# Patient Record
Sex: FEMALE | Race: WHITE | Employment: PART TIME | URBAN - METROPOLITAN AREA
[De-identification: names, ages, dates, MRNs, and addresses within clinical notes are randomized per-mention and may not be internally consistent; named-entity substitution may affect disease eponyms.]

---

## 2017-10-13 ENCOUNTER — HOSPITAL ENCOUNTER (OUTPATIENT)
Age: 67
Discharge: HOME | End: 2017-10-13
Attending: INTERNAL MEDICINE
Payer: COMMERCIAL

## 2017-10-13 DIAGNOSIS — G35 MULTIPLE SCLEROSIS (HCC): ICD-10-CM

## 2017-10-13 LAB — CREAT UR-MCNC: 1.1 MG/DL (ref 0.6–1.3)

## 2017-10-13 PROCEDURE — 74011250636 HC RX REV CODE- 250/636: Performed by: INTERNAL MEDICINE

## 2017-10-13 PROCEDURE — 82565 ASSAY OF CREATININE: CPT

## 2017-10-13 PROCEDURE — A9585 GADOBUTROL INJECTION: HCPCS | Performed by: INTERNAL MEDICINE

## 2017-10-13 PROCEDURE — 70553 MRI BRAIN STEM W/O & W/DYE: CPT

## 2017-10-13 RX ADMIN — GADOBUTROL 8 ML: 604.72 INJECTION INTRAVENOUS at 22:00

## 2018-08-06 ENCOUNTER — DOCUMENTATION ONLY (OUTPATIENT)
Dept: NEUROLOGY | Age: 68
End: 2018-08-06

## 2018-08-07 ENCOUNTER — OFFICE VISIT (OUTPATIENT)
Dept: NEUROLOGY | Age: 68
End: 2018-08-07

## 2018-08-07 VITALS
BODY MASS INDEX: 31.14 KG/M2 | TEMPERATURE: 99 F | WEIGHT: 169.2 LBS | SYSTOLIC BLOOD PRESSURE: 110 MMHG | HEIGHT: 62 IN | OXYGEN SATURATION: 95 % | DIASTOLIC BLOOD PRESSURE: 72 MMHG | HEART RATE: 80 BPM | RESPIRATION RATE: 16 BRPM

## 2018-08-07 DIAGNOSIS — G47.09 OTHER INSOMNIA: ICD-10-CM

## 2018-08-07 DIAGNOSIS — F32.89 OTHER DEPRESSION: ICD-10-CM

## 2018-08-07 DIAGNOSIS — M54.81 BILATERAL OCCIPITAL NEURALGIA: ICD-10-CM

## 2018-08-07 DIAGNOSIS — G50.0 SUPRAORBITAL NEURALGIA: ICD-10-CM

## 2018-08-07 DIAGNOSIS — G44.221 CHRONIC TENSION-TYPE HEADACHE, INTRACTABLE: Primary | ICD-10-CM

## 2018-08-07 RX ORDER — NAPROXEN 500 MG/1
500 TABLET ORAL
Qty: 30 TAB | Refills: 2 | Status: SHIPPED | OUTPATIENT
Start: 2018-08-07

## 2018-08-07 NOTE — MR AVS SNAPSHOT
[address] [facility] ([place] 
 
 
 [address] [facility] [address] 
[phone] Patient: Becky Rivero MRN: [id number] IIA:[id number] Visit Information Date & Time Provider Department Dept. Phone Encounter #  
 8/7/2018  3:00 PM Daryle Perkins, MD [address] at 777 [facility] [id number] Follow-up Instructions Return in about 2 months (around 10/7/2018). Your Appointments 10/9/2018  8:30 AM  
Follow Up with Daryle Perkins, MD  
[address] at [address] [facility]) Appt Note: 2 month fu  
 [address] [address] [address] [facility] 630 [facility] [address] 200 [facility] Se Upcoming Health Maintenance Date Due Hepatitis C Screening 1950 DTaP/Tdap/Td series (1 - Tdap) 12/20/1971 BREAST CANCER SCRN MAMMOGRAM 12/20/2000 FOBT Q 1 YEAR AGE 50-75 12/20/2000 ZOSTER VACCINE AGE 60> 10/20/2010 GLAUCOMA SCREENING Q2Y 12/20/2015 Bone Densitometry (Dexa) Screening 12/20/2015 Pneumococcal 65+ Low/Medium Risk (1 of 2 - PCV13) 12/20/2015 Influenza Age 5 to Adult 8/1/2018 Allergies as of 8/7/2018  Review Complete On: 8/7/2018 By: Kayli Rendon LPN Severity Noted Reaction Type Reactions Betadine [Povidone-iodine]  11/28/2014    Rash Iodinated Contrast- Oral And Iv Dye  11/28/2014    Rash Macrodantin [Nitrofurantoin Macrocrystalline]  03/30/2016   Systemic Nausea and Vomiting Current Immunizations  Never Reviewed No immunizations on file. Not reviewed this visit You Were Diagnosed With   
  
 Codes Comments Chronic tension-type headache, intractable    -  Primary ICD-10-CM: W78.050 ICD-9-CM: 339.12 Supraorbital neuralgia     ICD-10-CM: G52.9 ICD-9-CM: 352.9 Bilateral occipital neuralgia     ICD-10-CM: M54.81 ICD-9-CM: 723.8  Other depression     ICD-10-CM: F32.89 
 ICD-9-CM: 596 Other insomnia     ICD-10-CM: G47.09 
ICD-9-CM: 780.52 Vitals BP Pulse Temp Resp Height(growth percentile) Weight(growth percentile) 110/72 (BP 1 Location: Left arm, BP Patient Position: Sitting) 80 99 °F (37.2 °C) (Oral) 16 5' 2\" (1.575 m) 169 lb 3.2 oz (76.7 kg) SpO2 BMI OB Status Smoking Status 95% 30.95 kg/m2 Hysterectomy Never Smoker Vitals History BMI and BSA Data Body Mass Index Body Surface Area 30.95 kg/m 2 1.83 m 2 Preferred Pharmacy Pharmacy Name Phone [facility], [address] [place] [address] Road [phone] Your Updated Medication List  
  
   
This list is accurate as of 8/7/18  4:05 PM.  Always use your most recent med list.  
  
  
  
  
 cyclobenzaprine 10 mg tablet Commonly known as:  FLEXERIL Take  by mouth three (3) times daily as needed for Muscle Spasm(s). naproxen 500 mg tablet Commonly known as:  NAPROSYN Take 1 Tab by mouth two (2) times daily as needed. Do not exceed two days a week Prescriptions Sent to Pharmacy Refills  
 naproxen (NAPROSYN) 500 mg tablet 2 Sig: Take 1 Tab by mouth two (2) times daily as needed. Do not exceed two days a week Class: Normal  
 Pharmacy: Con[facility]ita Valerio [address] [place] [address], [address] [place] [address] #: [phone] Route: Oral  
  
We Performed the Following REFERRAL TO PAIN CLINIC [YCI62 Custom] Comments:  
 Left supraorbital and bilateral occipital neuralgias, please evaluate for blocks. Thanks. REFERRAL TO PHYSICAL THERAPY [RBR37 Custom] Comments:  
 Bilateral occipital neuralgias, please evaluate and treat for cervical exercises, HEP. Thanks. Follow-up Instructions Return in about 2 months (around 10/7/2018). Referral Information Referral ID Referred By Referred To  
  
 [id number] [place] [address] [address]   
   [address] [facility] [address], [address] [place] Phone: [phone] Fax: [phone] Visits Status Start Date End Date 1 New Request 8/7/18 8/7/19 If your referral has a status of pending review or denied, additional information will be sent to support the outcome of this decision. Referral ID Referred By Referred To  
 [id number] Audrey Aw E Not Available Visits Status Start Date End Date 1 New Request 8/7/18 8/7/19 If your referral has a status of pending review or denied, additional information will be sent to support the outcome of this decision. Introducing [facility]! Dear Magda Mayfield: Thank you for requesting a [facility] account. Our records indicate that you already have an active [facility] account. You can access your account anytime at [email or web address]. [email or web address] Did you know that you can access your hospital and ER discharge instructions at any time in [facility]? You can also review all of your test results from your hospital stay or ER visit. Additional Information If you have questions, please visit the Frequently Asked Questions section of the [facility] website at [email or web address] Remember, [facility] is NOT to be used for urgent needs. For medical emergencies, dial 911. Now available from your iPhone and Android! Please provide this summary of care documentation to your next provider. Your primary care clinician is listed as Susu Timmons. If you have any questions after today's visit, please call [phone].
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
## 2018-08-07 NOTE — PROGRESS NOTES
Chief Complaint   Patient presents with   [facility] Establish Care    Neurologic Problem     MS, previously seen by Dr. Elle Reyes in [place], wants to establish with new doctor that is closer to her home. Says she was diagnosed in 46s, but previous MDs thought she has had it since her teens.  Headache     worse in past 2 years, starts behind left eye and they go through her head, causes nausea, pain never gets better then 5/10 in pain and goes \"off the charts\"- never goes away. The only thing that has helped is Excederin Migraine.  Muscle Pain     gets muscle aches in her legs, she states she gets broken blood vessels from them.      Weight Loss     has lost 50 lbs since December

## 2018-08-14 ENCOUNTER — HOSPITAL ENCOUNTER (OUTPATIENT)
Dept: PHYSICAL THERAPY | Age: 68
Discharge: HOME | End: 2018-08-14
Payer: COMMERCIAL

## 2018-08-14 PROCEDURE — 97110 THERAPEUTIC EXERCISES: CPT

## 2018-08-14 PROCEDURE — 97162 PT EVAL MOD COMPLEX 30 MIN: CPT

## 2018-08-14 PROCEDURE — G8984 CARRY CURRENT STATUS: HCPCS

## 2018-08-14 PROCEDURE — G8985 CARRY GOAL STATUS: HCPCS

## 2018-08-14 PROCEDURE — 97530 THERAPEUTIC ACTIVITIES: CPT

## 2018-08-14 NOTE — PROGRESS NOTES
PT DAILY TREATMENT NOTE     Patient Name: Arline Brock  Date:2018  : 1950  [x]  Patient  Verified  Payor: BLUE CROSS / Plan: [address] [place] [address] / Product Type: PPO /    In time:0800  Out time:08  Total Treatment Time (min): 42  Visit #: 1 of 8    Treatment Area: Chronic tension-type headache, intractable [G44.221]  Occipital neuralgia [M54.81]    SUBJECTIVE  Pain Level (0-10 scale): 5  Any medication changes, allergies to medications, adverse drug reactions, diagnosis change, or new procedure performed?: [x] No    [] Yes (see summary sheet for update)  Subjective functional status/changes:   [] No changes reported       OBJECTIVE    Modality rationale:    Min Type Additional Details    [] Estim:  []Unatt       []IFC  []Premod                        []Other:  []w/ice   []w/heat  Position:  Location:    [] Estim: []Att    []TENS instruct  []NMES                    []Other:  []w/US   []w/ice   []w/heat  Position:  Location:    []  Traction: [] Cervical       []Lumbar                       [] Prone          []Supine                       []Intermittent   []Continuous Lbs:  [] before manual  [] after manual    []  Ultrasound: []Continuous   [] Pulsed                           []1MHz   []3MHz W/cm2:  Location:    []  Iontophoresis with dexamethasone         Location: [] Take home patch   [] In clinic    []  Ice     []  heat  []  Ice massage  []  Laser   []  Anodyne Position:  Location:    []  Laser with stim  []  Other:  Position:  Location:    []  Vasopneumatic Device Pressure:       [] lo [] med [] hi   Temperature: [] lo [] med [] hi   [] Skin assessment post-treatment:  []intact []redness- no adverse reaction    []redness - adverse reaction:     22 min [x]Eval                  []Re-Eval       10 min Therapeutic Exercise:  [] See flow sheet : HEP   Rationale: increase ROM and increase strength to improve the patients ability to perform functional tasks    10 min Therapeutic Activity:  []  See flow sheet : Posture education, referred pain, MHP prior to HEP   Rationale: increase ROM  to improve the patients ability to improve functional mobilty             With   [] TE   [] TA   [] neuro   [] other: Patient Education: [x] Review HEP    [] Progressed/Changed HEP based on:   [x] positioning   [] body mechanics   [] transfers   [x] heat/ice application    [] other:      Other Objective/Functional Measures:       Pain Level (0-10 scale) post treatment: 5    ASSESSMENT/Changes in Function:      Patient will continue to benefit from skilled PT services to modify and progress therapeutic interventions, address functional mobility deficits, address ROM deficits, address strength deficits, analyze and address soft tissue restrictions, analyze and cue movement patterns and assess and modify postural abnormalities to attain remaining goals.      []  See Plan of Care  []  See progress note/recertification  []  See Discharge Summary         Progress towards goals / Updated goals:  Per [address]  []  Upgrade activities as tolerated     [x]  Continue plan of care  []  Update interventions per flow sheet       []  Discharge due to:_  []  Other:_      Kimani Darnell, PT 8/14/2018  8:46 AM    Future Appointments  Date Time Provider Yolanda Colbert   8/16/2018 7:30 AM Jose Fan, PTA [facility]   8/21/2018 7:00 AM Kimani Darnell, PT [facility]   8/23/2018 7:30 AM Jose Fan PTA [facility]   8/28/2018 7:00 AM Catherine Carrero, PTA [facility]   8/30/2018 7:30 AM Kimani Darnell, PT [facility]   9/4/2018 7:00 AM Kimani Darnell, PT [facility]   9/6/2018 7:00 AM Kimani Darnell, PT [facility]   10/9/2018 8:30 AM Stephany Boykin MD [address]

## 2018-08-14 NOTE — PROGRESS NOTES
[facility]  [address] [facility] [address]  [place], [address]  [phone] fax    Plan of Care/ Statement of Necessity for Physical Therapy Services    Patient name: Camille Yee Start of Care: 2018   Referral source: Mendel Rivers, MD : 1950    Medical Diagnosis: Chronic tension-type headache, intractable [G44.221]  Occipital neuralgia [M54.81]   Onset Date:1.5 years    Treatment Diagnosis: Chronic headaches, c/s myofascial restrictions, postural   Prior Hospitalization: see medical history Provider#: [id number]   Medications: Verified on Patient summary List    Comorbidities: ? MS, headaches   Prior Level of Function: functionally I with all activities      The Plan of Care and following information is based on the information from the initial evaluation. Assessment/ key information: 80 y/o female presents with c/o headaches that started about 1.5 years ago. She reports initially they were more intermittent and not as severe. She reports more recently they have become constant and debilitating due to the severity. She reports headaches are primarily located in the left frontal/orbital area but also from the top of her head and in the suboccipital region. She reports c/o neck pain as well but it is not as severe as the headaches. Pt demonstrates poor posture with increased t/s kyphosis, forward head and rounded shoulders, increased upper cervical extension. Poor t/s mobility and significant myofascial restrictions in c/s and t/s musculature. Significant + TTP noted throughout  B UT, c/s paraspinals, SCM and scalenes. Pt will benefit from PT to address aforementioned impairments.       Evaluation Complexity History MEDIUM  Complexity : 1-2 comorbidities / personal factors will impact the outcome/ POC ; Examination MEDIUM Complexity : 3 Standardized tests and measures addressing body structure, function, activity limitation and / or participation in recreation  ;Presentation MEDIUM Complexity : Evolving with changing characteristics  ; Clinical Decision Making MEDIUM Complexity : FOTO score of 26-74  Overall Complexity Rating: MEDIUM  Problem List: pain affecting function, decrease ROM, decrease strength, decrease ADL/ functional abilitiies, decrease activity tolerance and decrease flexibility/ joint mobility   Treatment Plan may include any combination of the following: Therapeutic exercise, Therapeutic activities, Neuromuscular re-education, Physical agent/modality, Manual therapy, Patient education and Self Care training  Patient / Family readiness to learn indicated by: asking questions and trying to perform skills  Persons(s) to be included in education: patient (P)  Barriers to Learning/Limitations: None  Patient Goal (s): To help with the headaches  Patient Self Reported Health Status: good  Rehabilitation Potential: fair    Short Term Goals: To be accomplished in 1 weeks:   1. Pt will be I and compliant with HEP  Long Term Goals: To be accomplished in 4 weeks:   1. Improve FOTO to predicted outcome for improved ability for daily tasks   2. Pt will report at least 50% reduction in headaches for improved ability to perform daily tasks   3. Pt will report increased posture awareness with daily activities to facilitate better posture. 4. Pt will report no difficulty with turning to look behind her. Frequency / Duration: Patient to be seen 2 times per week for 4 weeks. Patient/ Caregiver education and instruction: Diagnosis, prognosis, self care, activity modification and exercises   [x]  Plan of care has been reviewed with PTA    G-Codes (GP)  Carry   Current  CJ= 20-39%    Goal  CJ= 20-39%      The severity rating is based on clinical judgment and the FOTO score.     Certification Period: 08-14-18 to 09-13-18  Cori Bose, PT 8/14/2018 8:57 AM    ________________________________________________________________________    I certify that the above Therapy Services are being furnished while the patient is under my care. I agree with the treatment plan and certify that this therapy is necessary.     [de-identified] Signature:____________________  Date:____________Time: _________    Please sign and return to [facility]  [address] [facility] [address]  [place], [address]  [phone] fax

## 2018-08-16 ENCOUNTER — HOSPITAL ENCOUNTER (OUTPATIENT)
Dept: PHYSICAL THERAPY | Age: 68
Discharge: HOME | End: 2018-08-16
Payer: COMMERCIAL

## 2018-08-16 PROCEDURE — 97110 THERAPEUTIC EXERCISES: CPT

## 2018-08-16 PROCEDURE — 97140 MANUAL THERAPY 1/> REGIONS: CPT

## 2018-08-16 NOTE — PROGRESS NOTES
PT DAILY TREATMENT NOTE     Patient Name: Marie Blackwell  Date:2018  : 1950  [x]  Patient  Verified  Payor: BLUE CROSS / Plan: [address] [place] [address] / Product Type: PPO /    In time:7:30  Out time:8:15  Total Treatment Time (min): 45  Visit #: 2 of 8    Treatment Area: Chronic tension-type headache, intractable [G44.221]  Occipital neuralgia [M54.81]    SUBJECTIVE  Pain Level (0-10 scale): 5/10 HA, 6/10 C/S  Any medication changes, allergies to medications, adverse drug reactions, diagnosis change, or new procedure performed?: [x] No    [] Yes (see summary sheet for update)  Subjective functional status/changes:   [] No changes reported  \"I am sore and tight. \"    OBJECTIVE    37 min Therapeutic Exercise:  [x] See flow sheet :   Rationale: increase ROM, increase strength and increase proprioception to improve the patients ability to perform ADL's.    8 min Manual Therapy:  STM/DTM (B) UT, lev scap, C/S paraspinals. SOR. Rationale: decrease pain, increase ROM, increase tissue extensibility and decrease trigger points to improve activity tolerance. With   [x] TE   [] TA   [] neuro   [] other: Patient Education: [x] Review HEP    [] Progressed/Changed HEP based on:   [] positioning   [] body mechanics   [] transfers   [] heat/ice application    [] other:      Other Objective/Functional Measures: Initiated exercises per flow sheet. Pain Level (0-10 scale) post treatment: 4/10    ASSESSMENT/Changes in Function: First F/U visit. Poor posture, unable to fully correct after receiving VC's. Slight decrease in pain level post-treatment. Patient will continue to benefit from skilled PT services to modify and progress therapeutic interventions, address functional mobility deficits, address ROM deficits, address strength deficits, analyze and address soft tissue restrictions and analyze and modify body mechanics/ergonomics to attain remaining goals.      [x]  See Plan of Care  []  See progress note/recertification  []  See Discharge Summary         Progress towards goals / Updated goals:  Short Term Goals: To be accomplished in 1 weeks:                        1. Pt will be I and compliant with HEP - 8/16/2018  Long Term Goals: To be accomplished in 4 weeks:                        1. Improve FOTO to predicted outcome for improved ability for daily tasks                        2. Pt will report at least 50% reduction in headaches for improved ability to perform daily tasks                        3. Pt will report increased posture awareness with daily activities to facilitate better posture. 4. Pt will report no difficulty with turning to look behind her.      Frequency / Duration: Patient to be seen 2 times per week for 4 weeks.     PLAN  []  Upgrade activities as tolerated     [x]  Continue plan of care  []  Update interventions per flow sheet       []  Discharge due to:_  []  Other:_      David Nava PTA 8/16/2018  7:26 AM    Future Appointments  Date Time Provider Yolanda Colbert   8/16/2018 7:30 AM David Nava PTA [facility]   8/21/2018 7:00 AM Kylah Linder, PT [facility]   8/23/2018 7:30 AM David Nava, PTA [facility]   8/28/2018 7:00 AM Dangelo Hammond PTA [facility]   8/30/2018 7:30 AM Kylah Fortdoris, PT [facility]   9/4/2018 7:00 AM Kylah Fortdoris, PT [facility]   9/6/2018 7:00 AM Kylah Fortdoris, PT [facility]   10/9/2018 8:30 AM Renuka Herring MD [address] [place] [address]

## 2018-08-21 ENCOUNTER — HOSPITAL ENCOUNTER (OUTPATIENT)
Dept: PHYSICAL THERAPY | Age: 68
Discharge: HOME | End: 2018-08-21
Payer: COMMERCIAL

## 2018-08-21 PROCEDURE — 97140 MANUAL THERAPY 1/> REGIONS: CPT

## 2018-08-21 PROCEDURE — 97110 THERAPEUTIC EXERCISES: CPT

## 2018-08-21 NOTE — PROGRESS NOTES
PT DAILY TREATMENT NOTE     Patient Name: Emory Sacks  Date:2018  : 1950  [x]  Patient  Verified  Payor: BLUE CROSS / Plan: [address] [place] [address] / Product Type: PPO /    In time:7:00  Out time: 7:45  Total Treatment Time (min): 45  Visit #: 3 of 8    Treatment Area: Chronic tension-type headache, intractable [G44.221]  Occipital neuralgia [M54.81]    SUBJECTIVE  Pain Level (0-10 scale): 4/10  Any medication changes, allergies to medications, adverse drug reactions, diagnosis change, or new procedure performed?: [x] No    [] Yes (see summary sheet for update)  Subjective functional status/changes:   [] No changes reported  Pt reports she was sore after last time. OBJECTIVE    37 min Therapeutic Exercise:  [x] See flow sheet :   Rationale: increase ROM, increase strength and increase proprioception to improve the patients ability to perform ADL's.    8 min Manual Therapy:  STM/DTM to B SCM with focus on the right, C/S paraspinals. SOR. Rationale: decrease pain, increase ROM, increase tissue extensibility and decrease trigger points to improve activity tolerance. With   [x] TE   [] TA   [] neuro   [] other: Patient Education: [x] Review HEP    [] Progressed/Changed HEP based on:   [] positioning   [] body mechanics   [] transfers   [] heat/ice application    [] other:      Other Objective/Functional Measures: + trigger point proximal right SCM, increased reps with c/s rotation on ball    Pain Level (0-10 scale) post treatment: 4/10    ASSESSMENT/Changes in Function:  Pt with reproduction of headache pain with trigger point along right SCM. Attempted TPR but did not change her symptoms.      Patient will continue to benefit from skilled PT services to modify and progress therapeutic interventions, address functional mobility deficits, address ROM deficits, address strength deficits, analyze and address soft tissue restrictions and analyze and modify body mechanics/ergonomics to attain remaining goals. [x]  See Plan of Care  []  See progress note/recertification  []  See Discharge Summary         Progress towards goals / Updated goals:  Short Term Goals: To be accomplished in 1 weeks:                        1. Pt will be I and compliant with HEP - 8/16/2018  Long Term Goals: To be accomplished in 4 weeks:                        1. Improve FOTO to predicted outcome for improved ability for daily tasks                        2. Pt will report at least 50% reduction in headaches for improved ability to perform daily tasks                        3. Pt will report increased posture awareness with daily activities to facilitate better posture.                          4. Pt will report no difficulty with turning to look behind her.          PLAN  []  Upgrade activities as tolerated     [x]  Continue plan of care  []  Update interventions per flow sheet       []  Discharge due to:_  []  Other:_      Reuben Monson, PT 8/21/2018  7:06 AM    Future Appointments  Date Time Provider Yolanda Colbert   8/23/2018 7:30 AM Cheryl Bynum, PTA [facility]   8/28/2018 7:00 AM [facility]   8/30/2018 7:30 AM [facility]   9/4/2018 7:00 AM [facility]   9/6/2018 7:00 AM [facility]   10/9/2018 8:30 AM Rhea Soler MD [address]

## 2018-08-28 ENCOUNTER — HOSPITAL ENCOUNTER (OUTPATIENT)
Dept: PHYSICAL THERAPY | Age: 68
Discharge: HOME | End: 2018-08-28
Payer: COMMERCIAL

## 2018-08-28 PROCEDURE — 97140 MANUAL THERAPY 1/> REGIONS: CPT

## 2018-08-28 NOTE — PROGRESS NOTES
PT DAILY TREATMENT NOTE     Patient Name: Jayesh Miller  Date:2018  : 1950  [x]  Patient  Verified  Payor: JAIME Eure / Plan: [address] [place] [address] / Product Type: PPO /    In time:9:00  Out time:9:42  Total Treatment Time (min): 42  Visit #: 4 of 8    Treatment Area: Chronic tension-type headache, intractable [G44.221]  Occipital neuralgia [M54.81]    SUBJECTIVE  Pain Level (0-10 scale): 3/10  Any medication changes, allergies to medications, adverse drug reactions, diagnosis change, or new procedure performed?: [x] No    [] Yes (see summary sheet for update)  Subjective functional status/changes:   [] No changes reported  \"It's been really sore. Started with a bad HA on Saturday, feels like the mm's are ripping when I move. OBJECTIVE    34 min Therapeutic Exercise:  [x] See flow sheet :   Rationale: increase ROM, increase strength and increase proprioception to improve the patients ability to perform ADL's.     8 min Manual Therapy:  STM/DTM (B) UT, lev scap, C/S paraspinals, SCM, scalenes. SOR. Rationale: decrease pain, increase ROM, increase tissue extensibility and decrease trigger points to improve activity tolerance. With   [x] TE   [] TA   [] neuro   [] other: Patient Education: [x] Review HEP    [] Progressed/Changed HEP based on:   [] positioning   [] body mechanics   [] transfers   [] heat/ice application    [] other:      Other Objective/Functional Measures: Held several exercises secondary to pain. Exacerbated symptoms in the last few days. Pain Level (0-10 scale) post treatment: 6/10    ASSESSMENT/Changes in Function: Pain reported pain increased while performing exercises, decreased to 6/10 post-manual. Several restrictions noted throughout C/S musculature, occasional cueing to correct posture.     Patient will continue to benefit from skilled PT services to modify and progress therapeutic interventions, address functional mobility deficits, address ROM deficits, address strength deficits, analyze and address soft tissue restrictions and analyze and modify body mechanics/ergonomics to attain remaining goals. [x]  See Plan of Care  []  See progress note/recertification  []  See Discharge Summary         Progress towards goals / Updated goals:  Short Term Goals: To be accomplished in 1 weeks:                        1. Pt will be I and compliant with HEP - 8/16/2018  Long Term Goals: To be accomplished in 4 weeks:                        0. Improve FOTO to predicted outcome for improved ability for daily tasks                        0. Pt will report at least 50% reduction in headaches for improved ability to perform daily tasks                        2. Pt will report increased posture awareness with daily activities to facilitate better posture.                         4. Pt will report no difficulty with turning to look behind her. -  Not met.  8/28/2018    PLAN  []  Upgrade activities as tolerated     [x]  Continue plan of care  []  Update interventions per flow sheet       []  Discharge due to:_  []  Other:_      Elysia Lomeli PTA 8/28/2018  8:55 AM    Future Appointments  Date Time Provider Yolanda Colbert   8/28/2018 9:00 AM Elysia Lomeli PTA [facility]   8/30/2018 7:30 AM Gladys Gongora, PT [facility]   9/4/2018 7:00 AM [facility]   9/6/2018 7:00 AM [facility]   10/9/2018 8:30 AM Mariajose Helms MD [address]

## 2018-08-30 ENCOUNTER — HOSPITAL ENCOUNTER (OUTPATIENT)
Dept: PHYSICAL THERAPY | Age: 68
Discharge: HOME | End: 2018-08-30
Payer: COMMERCIAL

## 2018-08-30 PROCEDURE — 97014 ELECTRIC STIMULATION THERAPY: CPT

## 2018-08-30 PROCEDURE — 97110 THERAPEUTIC EXERCISES: CPT

## 2018-08-30 PROCEDURE — 97140 MANUAL THERAPY 1/> REGIONS: CPT

## 2018-08-30 NOTE — PROGRESS NOTES
PT DAILY TREATMENT NOTE     Patient Name: Maria G Machado  Date:2018  : 1950  [x]  Patient  Verified  Payor: BLUE CROSS / Plan: [address] [place] [address] / Product Type: PPO /    In time:730  Out time:8:08  Total Treatment Time (min):38   Visit #: 5 of 8    Treatment Area: Chronic tension-type headache, intractable [G44.221]  Occipital neuralgia [M54.81]    SUBJECTIVE  Pain Level (0-10 scale): 610  Any medication changes, allergies to medications, adverse drug reactions, diagnosis change, or new procedure performed?: [x] No    [] Yes (see summary sheet for update)  Subjective functional status/changes:   [] No changes reported  Pt reports pain increased since last session. She reports inability to turn her head to the right and feels like her muscles are ripping. OBJECTIVE    8 min Therapeutic Exercise:  [x] See flow sheet :   Rationale: increase ROM, increase strength and increase proprioception to improve the patients ability to perform ADL's. 20 min Manual Therapy:  Gentle STM/ MFR UT, lev scap, C/S paraspinals, SCM, scalenes. SOR. MET for right c/s rotation and PROM    Rationale: decrease pain, increase ROM, increase tissue extensibility and decrease trigger points to improve activity tolerance.     Modality rationale: decrease inflammation and decrease pain to improve the patients ability to perform functional tasks   Min Type Additional Details   10 [x] Estim:  [x]Unatt       []IFC  [x]Premod                        []Other:  [x]w/ice   []w/heat  Position: supine  Location:right UT    [] Estim: []Att    []TENS instruct  []NMES                    []Other:  []w/US   []w/ice   []w/heat  Position:  Location:    []  Traction: [] Cervical       []Lumbar                       [] Prone          []Supine                       []Intermittent   []Continuous Lbs:  [] before manual  [] after manual    []  Ultrasound: []Continuous   [] Pulsed                           []1MHz   []3MHz Location:  W/cm2:    []  Iontophoresis with dexamethasone         Location: [] Take home patch   [] In clinic    []  Ice     []  heat  []  Ice massage  []  Laser   []  Anodyne Position:  Location:    []  Laser with stim  []  Other: Position:  Location:    []  Vasopneumatic Device Pressure:       [] lo [] med [] hi   Temperature: [] lo [] med [] hi   [] Skin assessment post-treatment:  []intact []redness- no adverse reaction    []redness - adverse reaction:       With   [x] TE   [] TA   [] neuro   [] other: Patient Education: [x] Review HEP    [] Progressed/Changed HEP based on:   [] positioning   [] body mechanics   [] transfers   [] heat/ice application    [] other:      Other Objective/Functional Measures:  Held therex per flow sheet due to pain increase. Pain Level (0-10 scale) post treatment: 6/10    ASSESSMENT/Changes in Function: pt with significant right rotation restriction. Difficult to exam due to hypersensitivity to palpation. Performed ESTIM with CP following manual to assist with pain control. Pt still reported 6/10 pain level but also reported diminished tightness and felt she had some improved mobility. Patient will continue to benefit from skilled PT services to modify and progress therapeutic interventions, address functional mobility deficits, address ROM deficits, address strength deficits, analyze and address soft tissue restrictions and analyze and modify body mechanics/ergonomics to attain remaining goals. [x]  See Plan of Care  []  See progress note/recertification  []  See Discharge Summary         Progress towards goals / Updated goals:  Short Term Goals: To be accomplished in 1 weeks:                        1. Pt will be I and compliant with HEP - 8/16/2018  Long Term Goals: To be accomplished in 4 weeks:                        7.  Improve FOTO to predicted outcome for improved ability for daily tasks                        6. Pt will report at least 50% reduction in headaches for improved ability to perform daily tasks                        5. Pt will report increased posture awareness with daily activities to facilitate better posture.                         4. Pt will report no difficulty with turning to look behind her. -  Not met.  8/28/2018    PLAN  []  Upgrade activities as tolerated     [x]  Continue plan of care  []  Update interventions per flow sheet       []  Discharge due to:_  []  Other:_      Reno Rossi, PT 8/30/2018  7:35 AM    Future Appointments  Date Time Provider Yolanda Coatesi   9/4/2018 7:00 AM Reno Rossi, PT [facility]   9/6/2018 7:00 AM Reno Rossi, PT [facility]   10/9/2018 8:30 AM Vivi Dan MD [address]

## 2018-09-04 ENCOUNTER — HOSPITAL ENCOUNTER (OUTPATIENT)
Dept: PHYSICAL THERAPY | Age: 68
Discharge: HOME | End: 2018-09-04
Payer: COMMERCIAL

## 2018-09-04 PROCEDURE — 97014 ELECTRIC STIMULATION THERAPY: CPT

## 2018-09-04 PROCEDURE — 97110 THERAPEUTIC EXERCISES: CPT

## 2018-09-04 PROCEDURE — 97140 MANUAL THERAPY 1/> REGIONS: CPT

## 2018-09-06 ENCOUNTER — HOSPITAL ENCOUNTER (OUTPATIENT)
Dept: PHYSICAL THERAPY | Age: 68
Discharge: HOME | End: 2018-09-06
Payer: COMMERCIAL

## 2018-09-06 PROCEDURE — G8985 CARRY GOAL STATUS: HCPCS

## 2018-09-06 PROCEDURE — 97140 MANUAL THERAPY 1/> REGIONS: CPT

## 2018-09-06 PROCEDURE — G8984 CARRY CURRENT STATUS: HCPCS

## 2018-09-06 PROCEDURE — 97014 ELECTRIC STIMULATION THERAPY: CPT

## 2018-09-06 PROCEDURE — 97110 THERAPEUTIC EXERCISES: CPT

## 2018-09-06 NOTE — PROGRESS NOTES
PT DAILY TREATMENT NOTE     Patient Name: Whitney Thomas  Date:2018  : 1950  [x]  Patient  Verified  Payor: [place] MED[place] / Plan: [place] MEDICARE PART A / Product Type: Medicare /    In HOPS:3036  Out time:0753  Total Treatment Time (min): 55  Visit #: 7 of 8    Treatment Area: Chronic tension-type headache, intractable [G44.221]  Occipital neuralgia [M54.81]    SUBJECTIVE  Pain Level (0-10 scale): 4/10  Any medication changes, allergies to medications, adverse drug reactions, diagnosis change, or new procedure performed?: [x] No    [] Yes (see summary sheet for update)  Subjective functional status/changes:   [] No changes reported  Pt reports she feels better today. She reports improvement since starting with improved ROM. She states headaches have not improved. OBJECTIVE    33 min Therapeutic Exercise:  [x] See flow sheet :   Rationale: increase ROM, increase strength and increase proprioception to improve the patients ability to perform ADL's.     12 min Manual Therapy:  Gentle STM/ MFR UT, lev scap, C/S paraspinals, SCM, scalenes. SOR and PROM    Rationale: decrease pain, increase ROM, increase tissue extensibility and decrease trigger points to improve activity tolerance.     Modality rationale: decrease inflammation and decrease pain to improve the patients ability to perform functional tasks   Min Type Additional Details   10 [x] Estim:  [x]Unatt       []IFC  [x]Premod                        []Other:  [x]w/ice   []w/heat  Position: supine  Location:right UT    [] Estim: []Att    []TENS instruct  []NMES                    []Other:  []w/US   []w/ice   []w/heat  Position:  Location:    []  Traction: [] Cervical       []Lumbar                       [] Prone          []Supine                       []Intermittent   []Continuous Lbs:  [] before manual  [] after manual    []  Ultrasound: []Continuous   [] Pulsed                           []1MHz   []3MHz Location:  W/cm2:    []  Iontophoresis with dexamethasone         Location: [] Take home patch   [] In clinic    []  Ice     []  heat  []  Ice massage  []  Laser   []  Anodyne Position:  Location:    []  Laser with stim  []  Other: Position:  Location:    []  Vasopneumatic Device Pressure:       [] lo [] med [] hi   Temperature: [] lo [] med [] hi   [] Skin assessment post-treatment:  []intact []redness- no adverse reaction    []redness - adverse reaction:       With   [x] TE   [] TA   [] neuro   [] other: Patient Education: [x] Review HEP    [] Progressed/Changed HEP based on:   [] positioning   [] body mechanics   [] transfers   [] heat/ice application    [] other:      Other Objective/Functional Measures:  C/S AROM rotation: right: 52  Degrees   Left: 60 Degrees , FOTO: 43    Pain Level (0-10 scale) post treatment: 5    ASSESSMENT/Changes in Function:  Pt with improve c/s rotation and pt reports improvement with daily activities, like driving, despite significant decline in FOTO score. She will benefit from continuation to further her functional status. Patient will continue to benefit from skilled PT services to modify and progress therapeutic interventions, address functional mobility deficits, address ROM deficits, address strength deficits, analyze and address soft tissue restrictions and analyze and modify body mechanics/ergonomics to attain remaining goals. []  See Plan of Care  [x]  See progress note/recertification  []  See Discharge Summary         Progress towards goals / Updated goals:  Short Term Goals: To be accomplished in 1 weeks:                        1. Pt will be I and compliant with HEP - 8/16/2018   Long Term Goals: To be accomplished in 4 weeks:                        3.  Improve FOTO to predicted outcome for improved ability for daily tasks                        3. Pt will report at least 50% reduction in headaches for improved ability to perform daily tasks- no change 9-6-18                        3. Pt will report increased posture awareness with daily activities to facilitate better posture. - MET 9-4-18                        0. Pt will report no difficulty with turning to look behind her.  -  Progressing 9-6-18    PLAN  []  Upgrade activities as tolerated     [x]  Continue plan of care  []  Update interventions per flow sheet       []  Discharge due to:_  []  Other:_      Nancie Cheung, PT 9/6/2018  7:05 AM    Future Appointments  Date Time Provider Yolanda Colbert   10/9/2018 8:30 AM Valentín Stoll MD [address]

## 2018-09-13 ENCOUNTER — HOSPITAL ENCOUNTER (OUTPATIENT)
Dept: PHYSICAL THERAPY | Age: 68
Discharge: HOME | End: 2018-09-13
Payer: COMMERCIAL

## 2018-09-13 PROCEDURE — 97140 MANUAL THERAPY 1/> REGIONS: CPT

## 2018-09-13 PROCEDURE — 97110 THERAPEUTIC EXERCISES: CPT

## 2018-09-13 NOTE — PROGRESS NOTES
PT DAILY TREATMENT NOTE     Patient Name: Camille Yee  Date:2018  : 1950  [x]  Patient  Verified  Payor: JAIME RICKEY / Plan: [address] [place] [address] / Product Type: PPO /    In time:8:30  Out time:9:16  Total Treatment Time (min): 46  Visit #: 1 of     Treatment Area: Chronic tension-type headache, intractable [G44.221]  Occipital neuralgia [M54.81]    SUBJECTIVE  Pain Level (0-10 scale): 3/10  Any medication changes, allergies to medications, adverse drug reactions, diagnosis change, or new procedure performed?: [x] No    [] Yes (see summary sheet for update)  Subjective functional status/changes:   [] No changes reported  Pt reports some pain but states she drove from [place] yesterday. OBJECTIVE    Modality rationale: decrease inflammation and decrease pain to improve the patients ability to tolerate ADLs.     Min Type Additional Details    [] Estim:  []Unatt       []IFC  []Premod                        []Other:  []w/ice   []w/heat  Position:  Location:    [] Estim: []Att    []TENS instruct  []NMES                    []Other:  []w/US   []w/ice   []w/heat  Position:  Location:    []  Traction: [] Cervical       []Lumbar                       [] Prone          []Supine                       []Intermittent   []Continuous Lbs:  [] before manual  [] after manual    []  Ultrasound: []Continuous   [] Pulsed                           []1MHz   []3MHz W/cm2:  Location:    []  Iontophoresis with dexamethasone         Location: [] Take home patch   [] In clinic   10 [x]  Ice     []  heat  []  Ice massage  []  Laser   []  Anodyne Position: supine  Location:lumbar    []  Laser with stim  []  Other:  Position:  Location:    []  Vasopneumatic Device Pressure:       [] lo [] med [] hi   Temperature: [] lo [] med [] hi   [] Skin assessment post-treatment:  []intact []redness- no adverse reaction    []redness - adverse reaction:       26 min Therapeutic Exercise:  [x] See flow sheet : Rationale: increase ROM and increase strength to improve the patients ability to tolerate ADLs. 10 min Manual Therapy:  SOR, gentle MFR UT/LS/paraspinals. Rationale: decrease pain, increase ROM and increase tissue extensibility to improve tolerance to ADLs. With   [] TE   [] TA   [] neuro   [] other: Patient Education: [x] Review HEP    [] Progressed/Changed HEP based on:   [] positioning   [] body mechanics   [] transfers   [] heat/ice application    [] other:      Other Objective/Functional Measures: significant TTP to right UT, LS, CS paraspinals with manual treatment. Pain Level (0-10 scale) post treatment: 6/10    ASSESSMENT/Changes in Function: Pt had increased pain performing any exercises requiring movement of right UE. Patient will continue to benefit from skilled PT services to modify and progress therapeutic interventions, address functional mobility deficits, address ROM deficits, address strength deficits, analyze and address soft tissue restrictions, analyze and cue movement patterns and analyze and modify body mechanics/ergonomics to attain remaining goals. []  See Plan of Care  []  See progress note/recertification  []  See Discharge Summary         Progress towards goals / Updated goals:  Short Term Goals: To be accomplished in 1 weeks:                        1. Pt will be I and compliant with HEP - 8/16/2018   Long Term Goals: To be accomplished in 4 weeks:                        6. Improve FOTO to predicted outcome for improved ability for daily tasks                        0. Pt will report at least 50% reduction in headaches for improved ability to perform daily tasks- no change 9-6-18                        3. Pt will report increased posture awareness with daily activities to facilitate better posture. - MET 9-4-18                        0. Pt will report no difficulty with turning to look behind her.  -  Progressing 9-6-18    PLAN  []  Upgrade activities as tolerated     []  Continue plan of care  []  Update interventions per flow sheet       []  Discharge due to:_  []  Other:_      Oletha Gowers, PTA 9/13/2018  8:39 AM    Future Appointments  Date Time Provider Yolanda Colbert   9/17/2018 7:00 AM Sylvie Norton, PT [facility]   9/20/2018 7:30 AM Aidan Mcleod, PTA [facility]   9/24/2018 7:30 AM Aidan Mcleod PTA [facility]   9/27/2018 7:30 AM Aidan Mcleod, MICHAEL [facility]   10/1/2018 7:30 AM Sylvie Norton, PT [facility]   10/9/2018 8:30 AM Daryle Perkins, MD [address]

## 2018-09-14 ENCOUNTER — APPOINTMENT (OUTPATIENT)
Dept: PHYSICAL THERAPY | Age: 68
End: 2018-09-14
Payer: COMMERCIAL

## 2018-09-17 ENCOUNTER — HOSPITAL ENCOUNTER (OUTPATIENT)
Dept: PHYSICAL THERAPY | Age: 68
Discharge: HOME | End: 2018-09-17
Payer: COMMERCIAL

## 2018-09-17 PROCEDURE — 97110 THERAPEUTIC EXERCISES: CPT

## 2018-09-17 PROCEDURE — 97140 MANUAL THERAPY 1/> REGIONS: CPT

## 2018-09-17 PROCEDURE — 97014 ELECTRIC STIMULATION THERAPY: CPT

## 2018-09-17 NOTE — PROGRESS NOTES
PT DAILY TREATMENT NOTE     Patient Name: Summer Motta  Date:2018  : 1950  [x]  Patient  Verified  Payor: [employer] / Plan: [address] [place] [address] / Product Type: PPO /    In time:07  Out time:749  Total Treatment Time (min): 49  1:1 time:39  Visit #: 2 of 6-8    Treatment Area: Chronic tension-type headache, intractable [G44.221]  Occipital neuralgia [M54.81]    SUBJECTIVE  Pain Level (0-10 scale): 4/10  Any medication changes, allergies to medications, adverse drug reactions, diagnosis change, or new procedure performed?: [x] No    [] Yes (see summary sheet for update)  Subjective functional status/changes:   [] No changes reported  Pt reports pain in the right UT. OBJECTIVE    31 min Therapeutic Exercise:  [x] See flow sheet :   Rationale: increase ROM and increase strength to improve the patients ability to tolerate ADLs. 8 min Manual Therapy:  SOR, gentle MFR UT/ cervical paraspinals. Rationale: decrease pain, increase ROM and increase tissue extensibility to improve tolerance to ADLs.      Modality rationale: decrease inflammation and decrease pain to improve the patients ability to perform functional tasks   Min Type Additional Details   10 [x] Estim:  [x]Unatt       []IFC  [x]Premod                        []Other:  [x]w/ice   []w/heat  Position: supine  Location:right UT     [] Estim: []Att    []TENS instruct  []NMES                    []Other:  []w/US   []w/ice   []w/heat  Position:  Location:     []  Traction: [] Cervical       []Lumbar                       [] Prone          []Supine                       []Intermittent   []Continuous Lbs:  [] before manual  [] after manual     []  Ultrasound: []Continuous   [] Pulsed                           []1MHz   []3MHz Location:  W/cm2:     []  Iontophoresis with dexamethasone         Location: [] Take home patch   [] In clinic     []  Ice     []  heat  []  Ice massage  []  Laser   []  Anodyne Position:  Location:     []  Laser with stim  []  Other: Position:  Location:     []  Vasopneumatic Device Pressure:       [] lo [] med [] hi   Temperature: [] lo [] med [] hi   [] Skin assessment post-treatment:  []intact []redness- no adverse reaction    []redness - adverse reaction:             With   [] TE   [] TA   [] neuro   [] other: Patient Education: [x] Review HEP    [] Progressed/Changed HEP based on:   [] positioning   [] body mechanics   [] transfers   [] heat/ice application    [] other:      Other Objective/Functional Measures: added rows and ext    Pain Level (0-10 scale) post treatment: 6/10    ASSESSMENT/Changes in Function:  Pt remains hypersensitive throughout the right UT and c/s regions. Suspect possible right shoulder involvement. Patient will continue to benefit from skilled PT services to modify and progress therapeutic interventions, address functional mobility deficits, address ROM deficits, address strength deficits, analyze and address soft tissue restrictions, analyze and cue movement patterns and analyze and modify body mechanics/ergonomics to attain remaining goals. []  See Plan of Care  []  See progress note/recertification  []  See Discharge Summary         Progress towards goals / Updated goals:  Short Term Goals: To be accomplished in 1 weeks:                        1. Pt will be I and compliant with HEP - 8/16/2018   Long Term Goals: To be accomplished in 4 weeks:                        0. Improve FOTO to predicted outcome for improved ability for daily tasks                        3. Pt will report at least 50% reduction in headaches for improved ability to perform daily tasks- no change 9-6-18                        3. Pt will report increased posture awareness with daily activities to facilitate better posture. - MET 9-4-18                        6. Pt will report no difficulty with turning to look behind her.  -  Progressing 9-6-18    PLAN  []  Upgrade activities as tolerated     [x]  Continue plan of care  []  Update interventions per flow sheet       []  Discharge due to:_  []  Other:_      Ubaldo Juan, PT 9/17/2018  7:09 AM    Future Appointments  Date Time Provider Yolanda Colbert   9/20/2018 7:30 AM Dora Velez, MICHAEL [facility]   9/24/2018 7:30 [facility]   9/27/2018 7:30 [facility]   10/1/2018 7:30 AM Ubaldo Juan, PT [facility]   10/9/2018 8:30 AM Monisha Haque MD [address]

## 2018-09-20 ENCOUNTER — APPOINTMENT (OUTPATIENT)
Dept: PHYSICAL THERAPY | Age: 68
End: 2018-09-20
Payer: COMMERCIAL

## 2018-09-24 ENCOUNTER — APPOINTMENT (OUTPATIENT)
Dept: PHYSICAL THERAPY | Age: 68
End: 2018-09-24
Payer: COMMERCIAL

## 2018-09-27 ENCOUNTER — APPOINTMENT (OUTPATIENT)
Dept: PHYSICAL THERAPY | Age: 68
End: 2018-09-27
Payer: COMMERCIAL

## 2018-09-27 NOTE — PROGRESS NOTES
[facility]  [address]  [place], [address]  [phone] fax    Physical Therapy Discharge Summary  Patient name: Maria G Machado Start of Care: 2018   Referral source: Daria Higuera MD : 1950                  Medical Diagnosis: Chronic tension-type headache, intractable [G44.221]  Occipital neuralgia [M54.81] Onset Date:1.5 years                         Treatment Diagnosis: Chronic headaches, c/s myofascial restrictions, postural   Prior Hospitalization: see medical history Provider#: [id number]   Medications: Verified on Patient summary List    Comorbidities: ? MS, headaches   Prior Level of Function: functionally I with all activities  Visits from Start of Care: 9    Missed Visits: 3  Reporting Period : 2018 to 2018    Summary of Care:  Goal: Pt will be I and compliant with HEP  Status at last note/certification: unable to reassess  Status at discharge: not met    Goal: Improve FOTO to predicted outcome for improved ability for daily tasks  Status at last note/certification: unable to reassess  Status at discharge: not met    Goal: Pt will report at least 50% reduction in headaches for improved ability to perform daily tasks  Status at last note/certification: unable to reassess  Status at discharge: not met    Goal: Pt will report increased posture awareness with daily activities to facilitate better posture. Status at last note/certification: unable to reassess  Status at discharge: not met    Goal: Pt will report no difficulty with turning to look behind her. Status at last note/certification: unable to reassess  Status at discharge: not met    ASSESSMENT/RECOMMENDATIONS:  Pt was seen for 9 therapy sessions. Per telephone log, on 2018, the pt requested to cancel all therapy appointments secondary to having other financial responsibilities at this time.  Pt is therefore d/c'ed from therapy and unable to reassess goals at this time.    [x]Discontinue therapy: []Patient has reached or is progressing toward set goals      []Patient is non-compliant or has abdicated      [x]Due to lack of appreciable progress towards set goals    Benjie Gar, PT 9/27/2018 3:47 PM

## 2018-10-01 ENCOUNTER — APPOINTMENT (OUTPATIENT)
Dept: PHYSICAL THERAPY | Age: 68
End: 2018-10-01

## 2018-10-05 ENCOUNTER — DOCUMENTATION ONLY (OUTPATIENT)
Dept: NEUROLOGY | Age: 68
End: 2018-10-05

## 2023-02-02 RX ORDER — CYCLOBENZAPRINE HCL 10 MG
TABLET ORAL 3 TIMES DAILY PRN
COMMUNITY

## 2023-02-02 RX ORDER — NAPROXEN 500 MG/1
500 TABLET ORAL 2 TIMES DAILY PRN
COMMUNITY
Start: 2018-08-07